# Patient Record
Sex: MALE | Race: OTHER | HISPANIC OR LATINO | Employment: STUDENT | URBAN - METROPOLITAN AREA
[De-identification: names, ages, dates, MRNs, and addresses within clinical notes are randomized per-mention and may not be internally consistent; named-entity substitution may affect disease eponyms.]

---

## 2018-04-14 ENCOUNTER — HOSPITAL ENCOUNTER (EMERGENCY)
Facility: HOSPITAL | Age: 18
Discharge: HOME | End: 2018-04-15
Attending: EMERGENCY MEDICINE
Payer: COMMERCIAL

## 2018-04-14 VITALS
HEART RATE: 95 BPM | DIASTOLIC BLOOD PRESSURE: 69 MMHG | SYSTOLIC BLOOD PRESSURE: 136 MMHG | OXYGEN SATURATION: 97 % | RESPIRATION RATE: 18 BRPM | TEMPERATURE: 98.9 F

## 2018-04-14 DIAGNOSIS — R11.10 VOMITING: Primary | ICD-10-CM

## 2018-04-14 DIAGNOSIS — R10.9 ABDOMINAL PAIN: ICD-10-CM

## 2018-04-14 DIAGNOSIS — R19.7 DIARRHEA: ICD-10-CM

## 2018-04-14 LAB
BACTERIA UR QL AUTO: ABNORMAL /HPF
BILIRUB UR QL STRIP: ABNORMAL
CLARITY UR: CLEAR
COLOR UR: YELLOW
GLUCOSE UR STRIP-MCNC: NEGATIVE MG/DL
HGB UR QL STRIP.AUTO: NEGATIVE
KETONES UR STRIP-MCNC: ABNORMAL MG/DL
LEUKOCYTE ESTERASE UR QL STRIP: NEGATIVE
MUCOUS THREADS UR QL AUTO: ABNORMAL
NITRITE UR QL STRIP: NEGATIVE
NON-SQ EPI CELLS URNS QL MICRO: ABNORMAL /HPF
PH UR STRIP.AUTO: 6.5 [PH] (ref 4.5–8)
PROT UR STRIP-MCNC: ABNORMAL MG/DL
RBC #/AREA URNS AUTO: ABNORMAL /HPF
SP GR UR STRIP.AUTO: 1.01 (ref 1–1.03)
UROBILINOGEN UR QL STRIP.AUTO: 1 E.U./DL
WBC #/AREA URNS AUTO: ABNORMAL /HPF

## 2018-04-14 PROCEDURE — 81001 URINALYSIS AUTO W/SCOPE: CPT

## 2018-04-14 PROCEDURE — 81002 URINALYSIS NONAUTO W/O SCOPE: CPT | Performed by: EMERGENCY MEDICINE

## 2018-04-14 RX ORDER — ONDANSETRON 4 MG/1
4 TABLET, ORALLY DISINTEGRATING ORAL ONCE
Status: COMPLETED | OUTPATIENT
Start: 2018-04-14 | End: 2018-04-14

## 2018-04-14 RX ADMIN — ONDANSETRON 4 MG: 4 TABLET, ORALLY DISINTEGRATING ORAL at 22:43

## 2018-04-15 PROCEDURE — 99283 EMERGENCY DEPT VISIT LOW MDM: CPT

## 2018-04-15 RX ORDER — ONDANSETRON 4 MG/1
4 TABLET, ORALLY DISINTEGRATING ORAL EVERY 8 HOURS PRN
Qty: 12 TABLET | Refills: 0 | Status: SHIPPED | OUTPATIENT
Start: 2018-04-14 | End: 2018-04-17

## 2018-04-15 RX ORDER — LOPERAMIDE HYDROCHLORIDE 2 MG/1
2 CAPSULE ORAL 4 TIMES DAILY PRN
Qty: 12 CAPSULE | Refills: 0 | Status: SHIPPED | OUTPATIENT
Start: 2018-04-15

## 2018-04-15 RX ORDER — DICYCLOMINE HCL 20 MG
20 TABLET ORAL 2 TIMES DAILY
Qty: 20 TABLET | Refills: 0 | Status: SHIPPED | OUTPATIENT
Start: 2018-04-15

## 2018-04-15 NOTE — ED NOTES
Pt reports no nausea  Has been able to tolerate PO without any problems  States he is comfortable going home   Requests anti-nausea pills for prescription     Jacinto Ruffin RN  04/14/18 5660

## 2018-04-15 NOTE — DISCHARGE INSTRUCTIONS
Náuseas y vómitos agudos   LO QUE NECESITA SABER:   Las náuseas y los vómitos agudos comienzan de forma repentina, [place] rápidamente y jain un período breve de Veronica  INSTRUCCIONES SOBRE EL SIGRID HOSPITALARIA:   Regrese a la myla de emergencias si:   · Usted nota danica en willis vómito o en kinza evacuaciones  · Usted siente un dolor súbito e intenso en el pecho y la parte superior de willis abdomen después de tener vómitos veronika o tratar de vomitar  · Usted tiene el naveen y el pecho inflamados  · [employer], tiene frío, sed y sequedad en los ojos y la boca  · Usted está orinando muy poco o nada en absoluto  · Usted tiene debilidad muscular, calambres en las piernas y dificultad para respirar  · Willis corazón late más rápido que de costumbre  · Usted continúa vomitando por más de 48 horas  Pregúntele a willis Beatriz Byes vitaminas y minerales son adecuados para usted  · Usted tiene arcadas secas (vómitos sin que salga nada) frecuentes  · Kinza náusea y vómitos no mejoran ni desaparecen después de usar el medicamento  · Usted tiene preguntas o inquietudes acerca de willis condición o tratamiento  Medicamentos:  Es posible que usted necesite alguno de los siguientes:  · Medicamentos,  se puede administrar para calmarle el estómago y detener kinza vómitos  Usted también puede necesitar medicamentos para ayudarlo a sentirse más relajado o para detener las náuseas y los vómitos causados por el mareo por movimiento  · Se usan los estimulantes gastrointestinales  para ayudar a vaciar willis estómago y los intestinos  [place] puede ayudar para reducir las náuseas y el vómito  · [place] kinza medicamentos bailee se le haya indicado  Consulte con willis médico si usted rickey que willis medicamento no le está ayudando o si presenta efectos secundarios  Infórmele si es alérgico a cualquier medicamento  Mantenga karen lista actualizada de los [place], las vitaminas y los productos herbales que patti   Storm Dionte siguientes datos de los medicamentos: cantidad, frecuencia y motivo de administración  Traiga con usted la lista o los envases de la píldoras a kinza citas de seguimiento  Lleve la lista de los medicamentos con usted en malgorzata de fidelina emergencia  Evite o controle las náuseas y los vómitos agudos:   · No consuma alcohol  El alcohol podría causarle malestar o irritación estomacal  Fidelina cantidad elevada de alcohol también puede causar náuseas y vómitos agudos  · Controle el estrés  Los renu de [place] que son el resultado de tensión nerviosa pueden causar náuseas y vómitos  Busque la manera de relajarse y controlar el estrés  Descanse y [employer]  · [employer] líquidos bailee se le indique  Los vómitos pueden llevar a la deshidratación  Es importante beber más líquidos para ayudar a reemplazar los fluidos corporales perdidos  Pregunte a nichols médico sobre la cantidad de líquido que necesita tushar todos los días y cuáles le recomienda  Nichols médico podría recomendarle que tome fidelina solución de rehidratación oral (SRO)  Las soluciones de rehidratación oral contienen la cantidad [facility], sales y azúcar que necesita para restituir los líquidos que perdió nichols organismo  Pregunte qué tipo de solución de rehidratación oral debe usar, qué cantidad debe tushar y dónde puede obtenerla  · Ingiera comidas más pequeñas, más a menudo  Coma pequeñas cantidades de comida cada 2 o 3 horas, incluso si no tiene hambre  Kinza náuseas podrían disminuir si tiene comida en el estómago  · Hable con nichols médico antes de tushar medicamentos de [facility]  Estos medicamentos pueden causar problemas serios si los [place] junto con ciertos medicamentos o si tiene determinadas condiciones médicas  Podrían tener problemas si [place] fidelina dosis demasiado alisha o los [place] liam más tiempo de lo indicado  Siga las indicaciones de la etiqueta al pie de la Tim  Acuda a kinza consultas de control con nichols médico según le indicaron    Anote las preguntas que tenga para no olvidarse de [employer] las visitas de seguimiento  © 2017 [facility] Information is for End User's use only and may not be sold, redistributed or otherwise used for commercial purposes  All illustrations and images included in CareNotes® are the copyrighted property of A D A M , Inc  or Mandeep Irwin  Esta información es sólo para uso en educación  Nichols intención no es darle un consejo médico sobre enfermedades o tratamientos  Colsulte con nichols Carollee Lovings farmacéutico antes de seguir cualquier régimen médico para saber si es seguro y efectivo para usted  Gastroenteritis   WHAT YOU NEED TO KNOW:   Gastroenteritis, or stomach flu, is an infection of the stomach and intestines  DISCHARGE INSTRUCTIONS:   Call 911 for any of the following:   · You have trouble breathing or a very fast pulse  Return to the emergency department if:   · You see blood in your diarrhea  · You cannot stop vomiting  · You have not urinated for 12 hours  · You feel like you are going to faint  Contact your healthcare provider if:   · You have a fever  · You continue to vomit or have diarrhea, even after treatment  · You see worms in your diarrhea  · Your mouth or eyes are dry  You are not urinating as much or as often  · You have questions or concerns about your condition or care  Medicines:   · Medicines  may be given to stop vomiting or diarrhea, decrease abdominal cramps, or treat an infection  · Take your medicine as directed  Contact your healthcare provider if you think your medicine is not helping or if you have side effects  Tell him or her if you are allergic to any medicine  Keep a list of the medicines, vitamins, and herbs you take  Include the amounts, and when and why you take them  Bring the list or the pill bottles to follow-up visits  Carry your medicine list with you in case of an emergency    Manage your symptoms:   · Drink liquids as directed  Ask your healthcare provider how much liquid to drink each day, and which liquids are best for you  You may also need to drink an oral rehydration solution (ORS)  An ORS has the right amounts of sugar, salt, and minerals in water to replace body fluids  · Eat bland foods  When you feel hungry, begin eating soft, bland foods  Examples are bananas, clear soup, potatoes, and applesauce  Do not have dairy products, alcohol, sugary drinks, or drinks with caffeine until you feel better  · Rest as much as possible  Slowly start to do more each day when you begin to feel better  Prevent the spread of gastroenteritis:  Gastroenteritis can spread easily  Keep yourself, your family, and your surroundings clean to help prevent the spread of gastroenteritis:  · Wash your hands often  Use soap and water  Wash your hands after you use the bathroom, change a child's diapers, or sneeze  Wash your hands before you prepare or eat food  · Clean surfaces and do laundry often  Wash your clothes and towels separately from the rest of the laundry  Clean surfaces in your home with antibacterial  or bleach  · Clean food thoroughly and cook safely  Wash raw vegetables before you cook  Cook meat, fish, and eggs fully  Do not use the same dishes for raw meat as you do for other foods  Refrigerate any leftover food immediately  · Be aware when you camp or travel  Drink only clean water  Do not drink from rivers or lakes unless you purify or boil the water first  When you travel, drink bottled water and do not add ice  Do not eat fruit that has not been peeled  Do not eat raw fish or meat that is not fully cooked  Follow up with your healthcare provider as directed:  Write down your questions so you remember to ask them during your visits  © 2017 [place] Information is for End User's use only and may not be sold, redistributed or otherwise used for commercial purposes   All illustrations and images included in CareNotes® are the copyrighted property of A D A M , Inc  or Mandeep Irwin  The above information is an  only  It is not intended as medical advice for individual conditions or treatments  Talk to your doctor, nurse or pharmacist before following any medical regimen to see if it is safe and effective for you

## 2018-04-15 NOTE — ED PROVIDER NOTES
History  Chief Complaint   Patient presents with    Vomiting     vomiting x4-5 states "i think it was head exhaustion" states unable to keep water down  HPI  42-year-old otherwise healthy male presents to the emergency department stating, I think I have heat exhaustion "  Patient states that he was outside in the heat today playing tennis for few hours and that after going home and trying to drink water, he was unable to keep anything down  He reports multiple episodes nonbloody, non bilious vomiting soon after drinking and states that he developed globus sensation and mild abdominal pain after throwing up multiple times  He complains of dry mouth and feeling dehydrated  He denies having had similar symptoms in the past   On review of systems systems, he complains of mild generalized fatigue, but denies any complaints other than stated above  None       Past Medical History:   Diagnosis Date    No known health problems        Past Surgical History:   Procedure Laterality Date    NO PAST SURGERIES         History reviewed  No pertinent family history  I have reviewed and agree with the history as documented  Social History   Substance Use Topics    Smoking status: Never Smoker    Smokeless tobacco: Never Used    Alcohol use No        Review of Systems   Constitutional: Negative for chills and fever  Respiratory: Negative for shortness of breath  Cardiovascular: Negative for chest pain  Gastrointestinal: Negative for abdominal pain, nausea and vomiting  Musculoskeletal: Negative for back pain  Skin: Negative for rash and wound  Allergic/Immunologic: Negative for immunocompromised state  Neurological: Negative for headaches  Psychiatric/Behavioral: The patient is not nervous/anxious  All other systems reviewed and are negative        Physical Exam  ED Triage Vitals [04/14/18 2223]   Temperature Pulse Respirations Blood Pressure SpO2   98 9 °F (37 2 °C) 95 18 136/69 97 %      Temp Source Heart Rate Source Patient Position - Orthostatic VS BP Location FiO2 (%)   Temporal -- Sitting Right arm --      Pain Score       4           Orthostatic Vital Signs  Vitals:    04/14/18 2223   BP: 136/69   Pulse: 95   Patient Position - Orthostatic VS: Sitting       Physical Exam   Constitutional: He is oriented to person, place, and time  He appears well-nourished  No distress  HENT:   Head: Normocephalic and atraumatic  Eyes: EOM are normal    Neck: Normal range of motion  Neck supple  Cardiovascular: Normal rate and regular rhythm  Pulmonary/Chest: Effort normal and breath sounds normal  No respiratory distress  Abdominal: Soft  He exhibits no distension  There is no tenderness  Musculoskeletal: Normal range of motion  Neurological: He is alert and oriented to person, place, and time  Skin: Skin is warm and dry  He is not diaphoretic  Psychiatric: He has a normal mood and affect  His behavior is normal    Nursing note and vitals reviewed        ED Medications  Medications   ondansetron (ZOFRAN-ODT) dispersible tablet 4 mg (4 mg Oral Given 4/14/18 2243)       Diagnostic Studies  Results Reviewed     Procedure Component Value Units Date/Time    Urine Microscopic [[id number]]  (Abnormal) Collected:  04/14/18 2237    Lab Status:  Final result Specimen:  Urine from Urine, Clean Catch Updated:  04/14/18 2256     RBC, UA None Seen /hpf      WBC, UA 0-1 (A) /hpf      Epithelial Cells Occasional /hpf      Bacteria, UA None Seen /hpf      MUCOUS THREADS Moderate (A)    POCT urinalysis dipstick [[id number]]  (Abnormal) Resulted:  04/14/18 2238    Lab Status:  Final result Updated:  04/14/18 2238    ED Urine Macroscopic [[id number]]  (Abnormal) Collected:  04/14/18 2237    Lab Status:  Final result Specimen:  Urine Updated:  04/14/18 2236     Color, UA Yellow     Clarity, UA Clear     pH, UA 6 5     Leukocytes, UA Negative     Nitrite, UA Negative     Protein, UA 30 (1+) (A) mg/dl      Glucose, UA Negative mg/dl      Ketones, UA Trace (A) mg/dl      Urobilinogen, UA 1 0 E U /dl      Bilirubin, UA Interference- unable to analyze (A)     Blood, UA Negative     Specific Gravity, UA 1 015    Narrative:       CLINITEK RESULT                 No orders to display         Procedures  Procedures      Phone Consults  ED Phone Contact    ED Course  ED Course as of Apr 15 0714   Sat Apr 14, 2018   2238 Ketones, UA: (!) Trace                               MDM  Number of Diagnoses or Management Options  Abdominal pain:   Diarrhea:   Vomiting:   Diagnosis management comments: 25-year-old male with nausea and vomiting the beginning to eat  Patient concerned with possible heat exhaustion, but afebrile on presentation  Will obtain UA to check ketones, administer Zofran, and p o  challenge  Will reassess and Disposition accordingly  While in the emergency department, patient began to have diarrhea  Nausea improved with Zofran and patient able tolerate p o  Symptoms likely related to gastroenteritis  Will treat symptomatically and discharged home  CritCare Time    Disposition  Final diagnoses:   Vomiting   Diarrhea   Abdominal pain     Time reflects when diagnosis was documented in both MDM as applicable and the Disposition within this note     Time User Action Codes Description Comment    4/14/2018 11:58 PM Arielle Xiao Add [R11 10] Vomiting     4/14/2018 11:58 PM Arielle Xiao Add [R19 7] Diarrhea     4/15/2018 12:06 AM Rita Quintero Add [R10 9] Abdominal pain       ED Disposition     ED Disposition Condition Comment    Discharge  Fahad Villareal discharge to home/self care      Condition at discharge: Good        Follow-up Information     Follow up With Specialties Details Why Contact Info Additional [address] [facility] Emergency Department Emergency Medicine   [address] [place] [address]  [phone] [place] [facility], [address]  , [place], [address] [facility], [address] Discharge Medication List as of 4/15/2018 12:07 AM      START taking these medications    Details   dicyclomine (BENTYL) 20 mg tablet Take 1 tablet (20 mg total) by mouth 2 (two) times a day, Starting Sun 4/15/2018, Print      loperamide (IMODIUM) 2 mg capsule Take 1 capsule (2 mg total) by mouth 4 (four) times a day as needed for diarrhea, Starting Sun 4/15/2018, Print      ondansetron (ZOFRAN-ODT) 4 mg disintegrating tablet Take 1 tablet (4 mg total) by mouth every 8 (eight) hours as needed for nausea or vomiting for up to 3 days, Starting Sat 4/14/2018, Until Tue 4/17/2018, Print           No discharge procedures on file  ED Provider  Attending physically available and evaluated Mark Anthonybaljeet Lacie ST managed the patient along with the ED Attending      Electronically Signed by         Billy Louise MD  04/15/18 3849

## 2018-04-15 NOTE — ED ATTENDING ATTESTATION
Derek Sorenson MD, saw and evaluated the patient  I have discussed the patient with the resident/non-physician practitioner and agree with the resident's/non-physician practitioner's findings, Plan of Care, and MDM as documented in the resident's/non-physician practitioner's note, except where noted  All available labs and Radiology studies were reviewed  At this point I agree with the current assessment done in the Emergency Department  I have conducted an independent evaluation of this patient a history and physical is as follows:    77-year-old male presents for evaluation over concern of possible heat exhaustion  Patient states he was playing a lot of tennis today and then had gradual onset of mild crampy abdominal pain, nausea, nonbloody nonbilious vomitus, diarrhea without blood or mucus  Patient states he feels worn down like he has the chills  Denies anorexia, fevers, chest pain, shortness of breath, body aches, myalgias  Ten systems reviewed and otherwise negative  On exam no acute distress, HEENT is normal, lungs normal cardiac normal, abdomen normal  Medical decision making;-patient is afebrile without signs or symptoms of true heat exhaustion or heat stroke    Abdominal pain, nausea, vomiting, diarrhea with benign exam-will do Zofran, p   O  challenge, reassess for dispo    Critical Care Time  CritCare Time    Procedures